# Patient Record
Sex: MALE | Employment: UNEMPLOYED | URBAN - METROPOLITAN AREA
[De-identification: names, ages, dates, MRNs, and addresses within clinical notes are randomized per-mention and may not be internally consistent; named-entity substitution may affect disease eponyms.]

---

## 2024-05-24 ENCOUNTER — HOSPITAL ENCOUNTER (EMERGENCY)
Age: 24
Discharge: HOME | End: 2024-05-24
Payer: COMMERCIAL

## 2024-05-24 VITALS
OXYGEN SATURATION: 98 % | BODY MASS INDEX: 24.33 KG/M2 | RESPIRATION RATE: 18 BRPM | WEIGHT: 160 LBS | DIASTOLIC BLOOD PRESSURE: 61 MMHG | SYSTOLIC BLOOD PRESSURE: 101 MMHG | HEART RATE: 87 BPM | TEMPERATURE: 98 F

## 2024-05-24 DIAGNOSIS — M25.551 RIGHT HIP PAIN: ICD-10-CM
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
## 2024-05-24 PROCEDURE — 99283 EMERGENCY DEPT VISIT LOW MDM: CPT

## 2024-05-24 PROCEDURE — 6370000000 HC RX 637 (ALT 250 FOR IP): Performed by: PHYSICIAN ASSISTANT

## 2024-05-24 RX ORDER — IBUPROFEN 600 MG/1
600 TABLET ORAL
Status: COMPLETED | OUTPATIENT
Start: 2024-05-24 | End: 2024-05-24

## 2024-05-24 RX ADMIN — IBUPROFEN 600 MG: 600 TABLET, FILM COATED ORAL at 13:28

## 2024-05-24 ASSESSMENT — PAIN SCALES - GENERAL: PAINLEVEL_OUTOF10: 7

## 2024-05-24 ASSESSMENT — PAIN DESCRIPTION - ORIENTATION: ORIENTATION: RIGHT

## 2024-05-24 ASSESSMENT — PAIN DESCRIPTION - LOCATION: LOCATION: HIP
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
## 2024-05-24 NOTE — DISCHARGE INSTRUCTIONS
Your exam is reassuring.  I do not suspect any long-term issues.  This is all soft tissue injury.  Ice the affected region.  Tylenol and ibuprofen at home for pain and discomfort.

## 2024-05-24 NOTE — ED NOTES
Patient mobility status  with no difficulty. Provider aware     I have reviewed discharge instructions with the patient and parent.  The patient and parent verbalized understanding.    Patient left ED via Discharge Method: ambulatory to Home with Extended Family:.    Opportunity for questions and clarification provided.     Patient given 0 scripts.